# Patient Record
Sex: FEMALE | Race: WHITE | Employment: OTHER | URBAN - METROPOLITAN AREA
[De-identification: names, ages, dates, MRNs, and addresses within clinical notes are randomized per-mention and may not be internally consistent; named-entity substitution may affect disease eponyms.]

---

## 2017-02-06 ENCOUNTER — TELEPHONE (OUTPATIENT)
Dept: FAMILY MEDICINE | Facility: CLINIC | Age: 77
End: 2017-02-06

## 2017-02-06 NOTE — TELEPHONE ENCOUNTER
thyroid     Last Written Prescription Date: 09/22/2016  Last Quantity: 90, # refills: 3  Last Office Visit with [facility], [facility] or [facility] prescribing provider: 09/22/16        TSH   Date Value Ref Range Status   09/22/2016 0.94 0.40 - 4.00 mU/L Final       Patient is in [place] until April and does not have her prescription- needs a 90 day supply sent to the [facility] in [place] per the [facility] Pharmacist.  Gave verbal order for one 90 day supply to be dispensed to patient.    Huong Hooker RN  [facility]  [phone]

## 2017-05-18 DIAGNOSIS — Z86.39 HISTORY OF HYPERTHYROIDISM: ICD-10-CM

## 2017-05-18 DIAGNOSIS — E89.0 HYPOTHYROIDISM, POSTABLATIVE: ICD-10-CM

## 2017-05-18 RX ORDER — THYROID 60 MG/1
TABLET ORAL
Qty: 28 TABLET | Refills: 0 | Status: SHIPPED | OUTPATIENT
Start: 2017-05-18 | End: 2017-05-25

## 2017-05-18 NOTE — TELEPHONE ENCOUNTER
[place] thyroid 60 mg      Last Written Prescription Date: 9/22/16  Last Quantity: 180, # refills: 1  Last Office Visit with [facility], P or [facility] prescribing provider: 9/22/16   Next 5 appointments (look out 90 days)     May 24, 2017  2:45 PM CDT   Office Visit with RA Deras CNP   [facility] ([facility])    [address] [place] [id number] [phone]                 TSH   Date Value Ref Range Status   09/22/2016 0.94 0.40 - 4.00 mU/L Final     Patient has been taking 2 tabs daily as instructed at last result note.  Request 2 weeks supply for coverage until next scheduled appt.     Sharonda Aldrich RN

## 2017-05-25 ENCOUNTER — OFFICE VISIT (OUTPATIENT)
Dept: FAMILY MEDICINE | Facility: CLINIC | Age: 77
End: 2017-05-25
Payer: COMMERCIAL

## 2017-05-25 VITALS
TEMPERATURE: 97.3 F | SYSTOLIC BLOOD PRESSURE: 136 MMHG | OXYGEN SATURATION: 98 % | HEIGHT: 66 IN | HEART RATE: 65 BPM | DIASTOLIC BLOOD PRESSURE: 80 MMHG | BODY MASS INDEX: 27.48 KG/M2 | RESPIRATION RATE: 16 BRPM | WEIGHT: 171 LBS

## 2017-05-25 DIAGNOSIS — E89.0 HYPOTHYROIDISM, POSTABLATIVE: ICD-10-CM

## 2017-05-25 DIAGNOSIS — N89.8 VAGINAL DISCHARGE: Primary | ICD-10-CM

## 2017-05-25 DIAGNOSIS — Z86.39 HISTORY OF HYPERTHYROIDISM: ICD-10-CM

## 2017-05-25 LAB
MICRO REPORT STATUS: NORMAL
SPECIMEN SOURCE: NORMAL
WET PREP SPEC: NORMAL

## 2017-05-25 PROCEDURE — 36415 COLL VENOUS BLD VENIPUNCTURE: CPT | Performed by: NURSE PRACTITIONER

## 2017-05-25 PROCEDURE — 84443 ASSAY THYROID STIM HORMONE: CPT | Performed by: NURSE PRACTITIONER

## 2017-05-25 PROCEDURE — 84439 ASSAY OF FREE THYROXINE: CPT | Performed by: NURSE PRACTITIONER

## 2017-05-25 PROCEDURE — 99213 OFFICE O/P EST LOW 20 MIN: CPT | Performed by: NURSE PRACTITIONER

## 2017-05-25 PROCEDURE — 87210 SMEAR WET MOUNT SALINE/INK: CPT | Performed by: NURSE PRACTITIONER

## 2017-05-25 RX ORDER — THYROID 60 MG/1
TABLET ORAL
Qty: 60 TABLET | Refills: 3 | Status: SHIPPED | OUTPATIENT
Start: 2017-05-25 | End: 2017-10-09

## 2017-05-25 NOTE — MR AVS SNAPSHOT
"              After Visit Summary   5/25/2017    Connie Watts    MRN: [id number]           Patient Information     Date Of Birth          1940        Visit Information        Provider Department      5/25/2017 2:30 PM Natividad Armstrong APRN CNP [facility]        Today's Diagnoses     Vaginal discharge    -  1    Hypothyroidism, postablative        History of hyperthyroidism           Follow-ups after your visit        Who to contact     If you have questions or need follow up information about today's clinic visit or your schedule please contact [facility] [place] directly at [phone].  Normal or non-critical lab and imaging results will be communicated to you by MyChart, letter or phone within 4 business days after the clinic has received the results. If you do not hear from us within 7 days, please contact the clinic through [employer] or phone. If you have a critical or abnormal lab result, we will notify you by phone as soon as possible.  Submit refill requests through [employer] or call your pharmacy and they will forward the refill request to us. Please allow 3 business days for your refill to be completed.          Additional Information About Your Visit        MyChart Information     [employer] gives you secure access to your electronic health record. If you see a primary care provider, you can also send messages to your care team and make appointments. If you have questions, please call your primary care clinic.  If you do not have a primary care provider, please call [phone] and they will assist you.        Care EveryWhere ID     This is your Care EveryWhere ID. This could be used by other organizations to access your [place] medical records  [id number]        Your Vitals Were     Pulse Temperature Respirations Height Pulse Oximetry BMI (Body Mass Index)    65 97.3  F (36.3  C) (Tympanic) 16 5' 6\" (1.676 m) 98% 27.6 kg/m2       Blood Pressure from Last 3 Encounters: "   05/25/17 136/80   09/22/16 122/80   08/12/16 142/80    Weight from Last 3 Encounters:   05/25/17 171 lb (77.6 kg)   09/22/16 176 lb (79.8 kg)   08/12/16 184 lb (83.5 kg)              We Performed the Following     TSH with free T4 reflex     Wet prep          Where to get your medicines      These medications were sent to [id number] - [place], [place] - [address]  [address], [facility] [address]     Phone:  [phone]     thyroid 60 MG tablet          Primary Care Provider    None Specified       No primary provider on file.        Thank you!     Thank you for choosing [facility]  for your care. Our goal is always to provide you with excellent care. Hearing back from our patients is one way we can continue to improve our services. Please take a few minutes to complete the written survey that you may receive in the mail after your visit with us. Thank you!             Your Updated Medication List - Protect others around you: Learn how to safely use, store and throw away your medicines at www.disposemymeds.org.          This list is accurate as of: 5/25/17  6:20 PM.  Always use your most recent med list.                   Brand Name Dispense Instructions for use    clonazePAM 0.5 MG tablet    klonoPIN    60 tablet    Take 1 tablet (0.5 mg) by mouth 2 times daily as needed for anxiety       thyroid 60 MG tablet    ARMOUR THYROID    60 tablet    Take 2 tablets per day       TRAMADOL HCL PO      Take by mouth every 6 hours as needed for moderate to severe pain

## 2017-05-25 NOTE — PROGRESS NOTES
SUBJECTIVE:                                                    Connie Watts is a 76 year old female who presents to clinic today for the following health issues:      Hypothyroidism Follow-up      Since last visit, patient describes the following symptoms: Weight stable, no hair loss, no skin changes, no constipation, no loose stools       Amount of exercise or physical activity: 6-7 days/week for an average of 15-30 minutes    Problems taking medications regularly: No    Medication side effects: none    Diet: regular (no restrictions)      Problem list and histories reviewed & adjusted, as indicated.    Additional history:   Her to refill [place] thyroid. H/O hyperthyroidism treated with radioactive iodine. Feels well. Has been in [place] for the winter months.  has normal pressure hydrocephalus with dementia. He is in [place] staying with friends who are caring for him. She plans to sell their home here and get a town home instead. Her 2 daughters live here.     Past week has seen a light yellow vaginal discharge on her underwear. No other symptoms.   H/O hysterectomy for cervical dysplasia       Patient Active Problem List   Diagnosis     Anxiety     Chronic back pain     Hypothyroidism, postablative     History of hyperthyroidism     CARDIOVASCULAR SCREENING; LDL GOAL LESS THAN 160     Rosacea     History of hysterectomy     Past Surgical History:   Procedure Laterality Date     HYSTERECTOMY      cervical dysplasia, has ovaries     HYSTERECTOMY, PAP NO LONGER INDICATED         Social History   Substance Use Topics     Smoking status: Former Smoker     Start date: 1/1/2010     Smokeless tobacco: Never Used     Alcohol use 4.2 oz/week     7 Standard drinks or equivalent per week     No family history on file.      Current Outpatient Prescriptions   Medication Sig Dispense Refill     TRAMADOL HCL PO Take by mouth every 6 hours as needed for moderate to severe pain       thyroid (ARMOUR THYROID) 60 MG tablet  "Take 2 tablets per day 60 tablet 3     clonazePAM (KLONOPIN) 0.5 MG tablet Take 1 tablet (0.5 mg) by mouth 2 times daily as needed for anxiety 60 tablet 3     [DISCONTINUED] thyroid (ARMOUR THYROID) 60 MG tablet Take 2 tablets per day 28 tablet 0       Reviewed and updated as needed this visit by clinical staff  Allergies       Reviewed and updated as needed this visit by Provider         ROS:  Constitutional, HEENT, cardiovascular, pulmonary, gi and gu systems are negative, except as otherwise noted.    OBJECTIVE:                                                    /80 (Cuff Size: Adult Large)  Pulse 65  Temp 97.3  F (36.3  C) (Tympanic)  Resp 16  Ht 5' 6\" (1.676 m)  Wt 171 lb (77.6 kg)  SpO2 98%  BMI 27.6 kg/m2  Body mass index is 27.6 kg/(m^2).  GENERAL APPEARANCE: healthy, alert and no distress  NECK: supple FROM, no thyromegaly  CHEST: clear lung sounds  HEART: normal s1/s2 w/o MGR RRR. No peripheral edema  ABDOMEN: soft,nontender, no HSM  EXTERNAL GENITALIA: normal  VAGINA: unable to insert speculum. Used q-tips to collect wet prep sample. Light yellow thin discharge     Results for orders placed or performed in visit on 05/25/17   Wet prep   Result Value Ref Range    Specimen Description Vagina     Wet Prep       No clue cells seen  No Trichomonas seen  No yeast seen      Micro Report Status FINAL 05/25/2017              ASSESSMENT/PLAN:                                                        ICD-10-CM    1. Vaginal discharge N89.8 Wet prep   2. Hypothyroidism, postablative E89.0 TSH with free T4 reflex     thyroid (ARMOUR THYROID) 60 MG tablet   3. History of hyperthyroidism Z86.39 thyroid (ARMOUR THYROID) 60 MG tablet       Discussed discharge, most likely normal. She will monitor   TSH drawn  Pt declines routine health cares and immunizations.   Follow up as needed    RA Deras CNP  [facility]    "

## 2017-05-25 NOTE — NURSING NOTE
"Chief Complaint   Patient presents with     Thyroid Problem       Initial /77 (Cuff Size: Adult Large)  Pulse 65  Temp 97.3  F (36.3  C) (Tympanic)  Resp 16  Ht 5' 6\" (1.676 m)  Wt 171 lb (77.6 kg)  SpO2 98%  BMI 27.6 kg/m2 Estimated body mass index is 27.6 kg/(m^2) as calculated from the following:    Height as of this encounter: 5' 6\" (1.676 m).    Weight as of this encounter: 171 lb (77.6 kg).  Medication Reconciliation: complete   Heather Briseno, CMA    "

## 2017-05-26 LAB
T4 FREE SERPL-MCNC: 0.75 NG/DL (ref 0.76–1.46)
TSH SERPL DL<=0.005 MIU/L-ACNC: 8.89 MU/L (ref 0.4–4)

## 2017-10-09 ENCOUNTER — OFFICE VISIT (OUTPATIENT)
Dept: FAMILY MEDICINE | Facility: CLINIC | Age: 77
End: 2017-10-09
Payer: COMMERCIAL

## 2017-10-09 VITALS
WEIGHT: 171 LBS | SYSTOLIC BLOOD PRESSURE: 120 MMHG | BODY MASS INDEX: 27.6 KG/M2 | HEART RATE: 86 BPM | TEMPERATURE: 98 F | DIASTOLIC BLOOD PRESSURE: 78 MMHG | OXYGEN SATURATION: 98 %

## 2017-10-09 DIAGNOSIS — F41.9 ANXIETY: ICD-10-CM

## 2017-10-09 DIAGNOSIS — E89.0 HYPOTHYROIDISM, POSTABLATIVE: Primary | ICD-10-CM

## 2017-10-09 DIAGNOSIS — Z28.21 VACCINATION REFUSED BY PATIENT: ICD-10-CM

## 2017-10-09 DIAGNOSIS — G89.29 CHRONIC MIDLINE LOW BACK PAIN WITHOUT SCIATICA: ICD-10-CM

## 2017-10-09 DIAGNOSIS — Z53.20 RECOMMENDATION REFUSED BY PATIENT: ICD-10-CM

## 2017-10-09 DIAGNOSIS — M54.50 CHRONIC MIDLINE LOW BACK PAIN WITHOUT SCIATICA: ICD-10-CM

## 2017-10-09 PROCEDURE — 99214 OFFICE O/P EST MOD 30 MIN: CPT | Performed by: INTERNAL MEDICINE

## 2017-10-09 PROCEDURE — 36415 COLL VENOUS BLD VENIPUNCTURE: CPT | Performed by: INTERNAL MEDICINE

## 2017-10-09 PROCEDURE — 84443 ASSAY THYROID STIM HORMONE: CPT | Performed by: INTERNAL MEDICINE

## 2017-10-09 RX ORDER — THYROID 60 MG/1
TABLET ORAL
Qty: 180 TABLET | Refills: 3 | Status: SHIPPED | OUTPATIENT
Start: 2017-10-09 | End: 2017-10-11

## 2017-10-09 RX ORDER — TRAMADOL HYDROCHLORIDE 50 MG/1
50-100 TABLET ORAL EVERY 6 HOURS PRN
Qty: 60 TABLET | Refills: 0 | Status: SHIPPED | OUTPATIENT
Start: 2017-10-09 | End: 2018-10-31

## 2017-10-09 RX ORDER — CLONAZEPAM 0.5 MG/1
0.5 TABLET ORAL 2 TIMES DAILY PRN
Qty: 60 TABLET | Refills: 1 | Status: SHIPPED | OUTPATIENT
Start: 2017-10-09 | End: 2018-10-31

## 2017-10-09 ASSESSMENT — ANXIETY QUESTIONNAIRES
1. FEELING NERVOUS, ANXIOUS, OR ON EDGE: NOT AT ALL
3. WORRYING TOO MUCH ABOUT DIFFERENT THINGS: NOT AT ALL
IF YOU CHECKED OFF ANY PROBLEMS ON THIS QUESTIONNAIRE, HOW DIFFICULT HAVE THESE PROBLEMS MADE IT FOR YOU TO DO YOUR WORK, TAKE CARE OF THINGS AT HOME, OR GET ALONG WITH OTHER PEOPLE: NOT DIFFICULT AT ALL
7. FEELING AFRAID AS IF SOMETHING AWFUL MIGHT HAPPEN: NOT AT ALL
2. NOT BEING ABLE TO STOP OR CONTROL WORRYING: NOT AT ALL
5. BEING SO RESTLESS THAT IT IS HARD TO SIT STILL: NOT AT ALL
GAD7 TOTAL SCORE: 0
6. BECOMING EASILY ANNOYED OR IRRITABLE: NOT AT ALL

## 2017-10-09 ASSESSMENT — PATIENT HEALTH QUESTIONNAIRE - PHQ9
5. POOR APPETITE OR OVEREATING: NOT AT ALL
SUM OF ALL RESPONSES TO PHQ QUESTIONS 1-9: 2

## 2017-10-09 NOTE — NURSING NOTE
"Chief Complaint   Patient presents with     Establish Care       Initial /78 (BP Location: Left arm, Patient Position: Chair, Cuff Size: Adult Regular)  Pulse 86  Temp 98  F (36.7  C) (Tympanic)  Wt 171 lb (77.6 kg)  SpO2 98%  BMI 27.6 kg/m2 Estimated body mass index is 27.6 kg/(m^2) as calculated from the following:    Height as of 5/25/17: 5' 6\" (1.676 m).    Weight as of this encounter: 171 lb (77.6 kg).  Medication Reconciliation: complete  "

## 2017-10-09 NOTE — MR AVS SNAPSHOT
After Visit Summary   10/9/2017    Connie Watts    MRN: [id number]           Patient Information     Date Of Birth          1940        Visit Information        Provider Department      10/9/2017 1:00 PM Heidy Coy MD [facility] [place]        Today's Diagnoses     Hypothyroidism, postablative    -  1    Anxiety        Chronic midline low back pain without sciatica        Vaccination refused by patient        Recommendation refused by patient           Follow-ups after your visit        Who to contact     If you have questions or need follow up information about today's clinic visit or your schedule please contact [facility] [place] directly at [phone].  Normal or non-critical lab and imaging results will be communicated to you by MyChart, letter or phone within 4 business days after the clinic has received the results. If you do not hear from us within 7 days, please contact the clinic through [employer] or phone. If you have a critical or abnormal lab result, we will notify you by phone as soon as possible.  Submit refill requests through [employer] or call your pharmacy and they will forward the refill request to us. Please allow 3 business days for your refill to be completed.          Additional Information About Your Visit        MyChart Information     [employer] gives you secure access to your electronic health record. If you see a primary care provider, you can also send messages to your care team and make appointments. If you have questions, please call your primary care clinic.  If you do not have a primary care provider, please call [phone] and they will assist you.        Care EveryWhere ID     This is your Care EveryWhere ID. This could be used by other organizations to access your [place] medical records  [id number]        Your Vitals Were     Pulse Temperature Pulse Oximetry BMI (Body Mass Index)          86 98  F (36.7  C) (Tympanic) 98%  27.6 kg/m2         Blood Pressure from Last 3 Encounters:   10/09/17 120/78   05/25/17 136/80   09/22/16 122/80    Weight from Last 3 Encounters:   10/09/17 171 lb (77.6 kg)   05/25/17 171 lb (77.6 kg)   09/22/16 176 lb (79.8 kg)              We Performed the Following     TSH          Today's Medication Changes          These changes are accurate as of: 10/9/17  8:57 PM.  If you have any questions, ask your nurse or doctor.               These medicines have changed or have updated prescriptions.        Dose/Directions    * TRAMADOL HCL PO   This may have changed:  Another medication with the same name was added. Make sure you understand how and when to take each.   Changed by:  Natividad Armstrong APRN CNP        Take by mouth every 6 hours as needed for moderate to severe pain   Refills:  0       * traMADol 50 MG tablet   Commonly known as:  ULTRAM   This may have changed:  You were already taking a medication with the same name, and this prescription was added. Make sure you understand how and when to take each.   Used for:  Chronic midline low back pain without sciatica   Changed by:  Heidy Coy MD        Dose:   mg   Take 1-2 tablets ( mg) by mouth every 6 hours as needed for pain maximum 2 tablet(s) per day   Quantity:  60 tablet   Refills:  0       * Notice:  This list has 2 medication(s) that are the same as other medications prescribed for you. Read the directions carefully, and ask your doctor or other care provider to review them with you.         Where to get your medicines      These medications were sent to [facility] # [id number] - [place], [place] - [address]  [address] [address]     Phone:  [phone]     thyroid 60 MG tablet         Some of these will need a paper prescription and others can be bought over the counter.  Ask your nurse if you have questions.     Bring a paper prescription for each of these medications     clonazePAM 0.5 MG tablet     traMADol 50 MG tablet                Primary Care Provider Office Phone # Fax #    Heidy Coy [phone]       [address]  [place] [address]        Equal Access to Services     YVROSE CHEN : Hadii aad ku hadtmoo Soomaali, waaxda luqadaha, qaybta kaalmada adeegyada, waxmarj idiin haykishann adetricia ann laAmiratee hardin. So [facility] [phone].    ATENCIÓN: Si habla español, tiene a pro disposición servicios gratuitos de asistencia lingüística. ame al [phone].    We comply with applicable federal civil rights laws and [place] laws. We do not discriminate on the basis of race, color, national origin, age, disability, sex, sexual orientation, or gender identity.            Thank you!     Thank you for choosing [facility] [place]  for your care. Our goal is always to provide you with excellent care. Hearing back from our patients is one way we can continue to improve our services. Please take a few minutes to complete the written survey that you may receive in the mail after your visit with us. Thank you!             Your Updated Medication List - Protect others around you: Learn how to safely use, store and throw away your medicines at www.disposemymeds.org.          This list is accurate as of: 10/9/17  8:57 PM.  Always use your most recent med list.                   Brand Name Dispense Instructions for use Diagnosis    clonazePAM 0.5 MG tablet    klonoPIN    60 tablet    Take 1 tablet (0.5 mg) by mouth 2 times daily as needed for anxiety    Anxiety       thyroid 60 MG tablet    ARMOUR THYROID    180 tablet    Take 2 tablets per day    Hypothyroidism, postablative       * TRAMADOL HCL PO      Take by mouth every 6 hours as needed for moderate to severe pain        * traMADol 50 MG tablet    ULTRAM    60 tablet    Take 1-2 tablets ( mg) by mouth every 6 hours as needed for pain maximum 2 tablet(s) per day    Chronic midline low back pain without sciatica       *  Notice:  This list has 2 medication(s) that are the same as other medications prescribed for you. Read the directions carefully, and ask your doctor or other care provider to review them with you.

## 2017-10-09 NOTE — PROGRESS NOTES
"  SUBJECTIVE:   Connie Watts is a 76 year old female who presents to clinic today for the following health issues:    Connie is here for medication check prior to leaving for [place] for the winter.     Hypothyroid - taking 120mg [place] thyroid daily.  Denies any symptoms of hyper or hypothyroidism, though does have difficulty keeping her weight stable.      Clonazepam - has been taking prn for years.  She denies feeling anxious frequently, but sometimes situations come up where she gets very stressed. 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      Tramadol - taking prn for her back.  She has a long history of back pain.  Had an injury last year where she \"cracked a vertebra.\" also a fall where she broke her foot.  That has made the pain worse. She does not take often. When she's in [place] there is a chiropractor that she goes to regularly that she finds helpful.               Problem list and histories reviewed & adjusted, as indicated.          Reviewed and updated as needed this visit by clinical staffTobacco  Allergies  Meds       Reviewed and updated as needed this visit by Provider         ROS:  Const, msk, neuro, endo, and psych reviewed,  otherwise negative unless noted above.       OBJECTIVE:     /78 (BP Location: Left arm, Patient Position: Chair, Cuff Size: Adult Regular)  Pulse 86  Temp 98  F (36.7  C) (Tympanic)  Wt 171 lb (77.6 kg)  SpO2 98%  BMI 27.6 kg/m2  Body mass index is 27.6 kg/(m^2).    Gen: elderly woman, no distress  Pulm: breathing comfortably, crackles at bases b/l   CV: RRR, normal S1 and S2, 2/6 murmur at RUSB   Ext: 2+ distal pulses, no LE edema       ASSESSMENT/PLAN:       1. Hypothyroidism, postablative  Her TSH was a little high when checked 5 months ago, but she cannot recall if she changed her medication dose after that.  Will check today again to make sure dose is appropriate.   - thyroid (ARMOUR THYROID) 60 MG " tablet; Take 2 tablets per day  Dispense: 180 tablet; Refill: 3  - TSH    2. Anxiety  Situational anxiety. There are no dispenses in [facility] for this year, appears as though she is not using often. Refill ordered.   - clonazePAM (KLONOPIN) 0.5 MG tablet; Take 1 tablet (0.5 mg) by mouth 2 times daily as needed for anxiety  Dispense: 60 tablet; Refill: 1    3. Chronic midline low back pain without sciatica  Again does not fill often, okay to take prn.    - traMADol (ULTRAM) 50 MG tablet; Take 1-2 tablets ( mg) by mouth every 6 hours as needed for pain maximum 2 tablet(s) per day  Dispense: 60 tablet; Refill: 0    4. Vaccination refused by patient  Offered flu shot and pneumonia vaccination. Patient declined. Said she knows they don't do anything.     5. Recommendation refused by patient  - strongly recommended DEXA scan as she has not had one and had a possible lumbar fracture.  She states why get it because the medications don't help anything, they just cover up your bones.   - declined mammogram   - reviewed other options for rosacea which she was initially interested in and then said no because skin irritation was a side effect     Follow up 6 months, when returns from [place]. Will continue to address health care maintenance items.     Heidy Coy MD  [facility]      **addendum. TSH is 0.11. Recommend decrease from 120mg daily to 126mg 6 days per week and 60mg one day per week.  Printed rx to get TSH drawn in [place].     Heidy Coy MD   10/11/2017

## 2017-10-10 LAB — TSH SERPL DL<=0.005 MIU/L-ACNC: 0.11 MU/L (ref 0.4–4)

## 2017-10-10 ASSESSMENT — ANXIETY QUESTIONNAIRES: GAD7 TOTAL SCORE: 0

## 2017-10-11 RX ORDER — THYROID 60 MG/1
TABLET ORAL
Qty: 180 TABLET | Refills: 3 | Status: SHIPPED | OUTPATIENT
Start: 2017-10-11 | End: 2018-11-01

## 2018-10-31 ENCOUNTER — OFFICE VISIT (OUTPATIENT)
Dept: FAMILY MEDICINE | Facility: CLINIC | Age: 78
End: 2018-10-31
Payer: COMMERCIAL

## 2018-10-31 VITALS
BODY MASS INDEX: 27.92 KG/M2 | TEMPERATURE: 95.5 F | WEIGHT: 173 LBS | DIASTOLIC BLOOD PRESSURE: 84 MMHG | HEART RATE: 84 BPM | SYSTOLIC BLOOD PRESSURE: 145 MMHG

## 2018-10-31 DIAGNOSIS — F41.9 ANXIETY: ICD-10-CM

## 2018-10-31 DIAGNOSIS — E89.0 HYPOTHYROIDISM, POSTABLATIVE: Primary | ICD-10-CM

## 2018-10-31 DIAGNOSIS — M54.50 CHRONIC MIDLINE LOW BACK PAIN WITHOUT SCIATICA: ICD-10-CM

## 2018-10-31 DIAGNOSIS — G89.29 CHRONIC MIDLINE LOW BACK PAIN WITHOUT SCIATICA: ICD-10-CM

## 2018-10-31 DIAGNOSIS — R03.0 ELEVATED BLOOD PRESSURE READING WITHOUT DIAGNOSIS OF HYPERTENSION: ICD-10-CM

## 2018-10-31 PROCEDURE — 84443 ASSAY THYROID STIM HORMONE: CPT | Performed by: NURSE PRACTITIONER

## 2018-10-31 PROCEDURE — 36415 COLL VENOUS BLD VENIPUNCTURE: CPT | Performed by: NURSE PRACTITIONER

## 2018-10-31 PROCEDURE — 99214 OFFICE O/P EST MOD 30 MIN: CPT | Performed by: NURSE PRACTITIONER

## 2018-10-31 RX ORDER — TRAMADOL HYDROCHLORIDE 50 MG/1
100 TABLET ORAL EVERY 6 HOURS PRN
Qty: 30 TABLET | Refills: 0 | Status: SHIPPED | OUTPATIENT
Start: 2018-10-31 | End: 2019-11-11

## 2018-10-31 RX ORDER — CLONAZEPAM 0.5 MG/1
0.5 TABLET ORAL 2 TIMES DAILY PRN
Qty: 60 TABLET | Refills: 0 | Status: SHIPPED | OUTPATIENT
Start: 2018-10-31 | End: 2019-11-11

## 2018-10-31 ASSESSMENT — ANXIETY QUESTIONNAIRES
2. NOT BEING ABLE TO STOP OR CONTROL WORRYING: NOT AT ALL
5. BEING SO RESTLESS THAT IT IS HARD TO SIT STILL: NOT AT ALL
GAD7 TOTAL SCORE: 0
7. FEELING AFRAID AS IF SOMETHING AWFUL MIGHT HAPPEN: NOT AT ALL
3. WORRYING TOO MUCH ABOUT DIFFERENT THINGS: NOT AT ALL
1. FEELING NERVOUS, ANXIOUS, OR ON EDGE: NOT AT ALL
IF YOU CHECKED OFF ANY PROBLEMS ON THIS QUESTIONNAIRE, HOW DIFFICULT HAVE THESE PROBLEMS MADE IT FOR YOU TO DO YOUR WORK, TAKE CARE OF THINGS AT HOME, OR GET ALONG WITH OTHER PEOPLE: NOT DIFFICULT AT ALL
6. BECOMING EASILY ANNOYED OR IRRITABLE: NOT AT ALL

## 2018-10-31 ASSESSMENT — PATIENT HEALTH QUESTIONNAIRE - PHQ9
SUM OF ALL RESPONSES TO PHQ QUESTIONS 1-9: 4
5. POOR APPETITE OR OVEREATING: NOT AT ALL
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
## 2018-10-31 NOTE — MR AVS SNAPSHOT
After Visit Summary   10/31/2018    Connie Watts    MRN: [id number]           Patient Information     Date Of Birth          1940        Visit Information        Provider Department      10/31/2018 2:00 PM Oliver Salmon NP [facility] [place]        Today's Diagnoses     Hypothyroidism, postablative    -  1    Chronic midline low back pain without sciatica        Anxiety           Follow-ups after your visit        Additional Services     SPORTS MEDICINE REFERRAL       Your provider has referred you to:  [facility]: [facility] - [place] -  [facility]  [phone]   [email or web address]    Please be aware that coverage of these services is subject to the terms and limitations of your health insurance plan.  Call member services at your health plan with any benefit or coverage questions.      Please bring the following to your appointment:    >>   Any x-rays, CTs or MRIs which have been performed.  Contact the facility where they were done to arrange for  prior to your scheduled appointment.    >>   List of current medications   >>   This referral request   >>   Any documents/labs given to you for this referral                  Who to contact     If you have questions or need follow up information about today's clinic visit or your schedule please contact [facility] [place] directly at [phone].  Normal or non-critical lab and imaging results will be communicated to you by MyChart, letter or phone within 4 business days after the clinic has received the results. If you do not hear from us within 7 days, please contact the clinic through MyChart or phone. If you have a critical or abnormal lab result, we will notify you by phone as soon as possible.  Submit refill requests through [employer] or call your pharmacy and they will forward the refill request to us.  Please allow 3 business days for your refill to be completed.          Additional Information About Your Visit        [employer] Information     [employer] gives you secure access to your electronic health record. If you see a primary care provider, you can also send messages to your care team and make appointments. If you have questions, please call your primary care clinic.  If you do not have a primary care provider, please call [phone] and they will assist you.        Care EveryWhere ID     This is your Care EveryWhere ID. This could be used by other organizations to access your [place] medical records  [id number]        Your Vitals Were     Pulse Temperature BMI (Body Mass Index)             84 95.5  F (35.3  C) (Tympanic) 27.92 kg/m2          Blood Pressure from Last 3 Encounters:   10/31/18 160/86   10/09/17 120/78   05/25/17 136/80    Weight from Last 3 Encounters:   10/31/18 173 lb (78.5 kg)   10/09/17 171 lb (77.6 kg)   05/25/17 171 lb (77.6 kg)              We Performed the Following     SPORTS MEDICINE REFERRAL     TSH with free T4 reflex          Today's Medication Changes          These changes are accurate as of 10/31/18  2:49 PM.  If you have any questions, ask your nurse or doctor.               These medicines have changed or have updated prescriptions.        Dose/Directions    traMADol 50 MG tablet   Commonly known as:  ULTRAM   This may have changed:  how much to take   Used for:  Chronic midline low back pain without sciatica   Changed by:  Oliver Salmon NP        Dose:  100 mg   Take 2 tablets (100 mg) by mouth every 6 hours as needed for pain maximum 2 tablet(s) per day   Quantity:  30 tablet   Refills:  0            Where to get your medicines      Some of these will need a paper prescription and others can be bought over the counter.  Ask your nurse if you have questions.     Bring a paper prescription for each of these medications     clonazePAM 0.5 MG tablet    traMADol 50 MG  tablet               Information about OPIOIDS     PRESCRIPTION OPIOIDS: WHAT YOU NEED TO KNOW   We gave you an opioid (narcotic) pain medicine. It is important to manage your pain, but opioids are not always the best choice. You should first try all the other options your care team gave you. Take this medicine for as short a time (and as few doses) as possible.    Some activities can increase your pain, such as bandage changes or therapy sessions. It may help to take your pain medicine 30 to 60 minutes before these activities. Reduce your stress by getting enough sleep, working on hobbies you enjoy and practicing relaxation or meditation. Talk to your care team about ways to manage your pain beyond prescription opioids.    These medicines have risks:    DO NOT drive when on new or higher doses of pain medicine. These medicines can affect your alertness and reaction times, and you could be arrested for driving under the influence (DUI). If you need to use opioids long-term, talk to your care team about driving.    DO NOT operate heavy machinery    DO NOT do any other dangerous activities while taking these medicines.    DO NOT drink any alcohol while taking these medicines.     If the opioid prescribed includes acetaminophen, DO NOT take with any other medicines that contain acetaminophen. Read all labels carefully. Look for the word  acetaminophen  or  Tylenol.  Ask your pharmacist if you have questions or are unsure.    You can get addicted to pain medicines, especially if you have a history of addiction (chemical, alcohol or substance dependence). Talk to your care team about ways to reduce this risk.    All opioids tend to cause constipation. Drink plenty of water and eat foods that have a lot of fiber, such as fruits, vegetables, prune juice, apple juice and high-fiber cereal. Take a laxative (Miralax, milk of magnesia, Colace, Senna) if you don t move your bowels at least every other day. Other side effects  include upset stomach, sleepiness, dizziness, throwing up, tolerance (needing more of the medicine to have the same effect), physical dependence and slowed breathing.    Store your pills in a secure place, locked if possible. We will not replace any lost or stolen medicine. If you don t finish your medicine, please throw away (dispose) as directed by your pharmacist. The [place] Pollution Control Agency has more information about safe disposal: [email or web address]         Primary Care Provider Office Phone # Fax #    Heidy Coy [phone]       [address] [place] [address]        Equal Access to Services     YVROSE CHEN : Gabino muellero Sobashir, waaxda luqadaha, qaybta kaalmada syedayacass, nirmala garcia . So [facility] [phone].    ATENCIÓN: Si habla español, tiene a pro disposición servicios gratuitos de asistencia lingüística. [facility] [phone].    We comply with applicable federal civil rights laws and [place] laws. We do not discriminate on the basis of race, color, national origin, age, disability, sex, sexual orientation, or gender identity.            Thank you!     Thank you for choosing [facility]  for your care. Our goal is always to provide you with excellent care. Hearing back from our patients is one way we can continue to improve our services. Please take a few minutes to complete the written survey that you may receive in the mail after your visit with us. Thank you!             Your Updated Medication List - Protect others around you: Learn how to safely use, store and throw away your medicines at www.disposemymeds.org.          This list is accurate as of 10/31/18  2:49 PM.  Always use your most recent med list.                   Brand Name Dispense Instructions for use Diagnosis    clonazePAM 0.5 MG tablet    klonoPIN    60 tablet    Take 1  tablet (0.5 mg) by mouth 2 times daily as needed for anxiety    Anxiety       thyroid 60 MG tablet    [place] THYROID    180 tablet    Take 2 tablets per day 6 days per week, take 1 tablet the 7th day of the week.    Hypothyroidism, postablative       traMADol 50 MG tablet    ULTRAM    30 tablet    Take 2 tablets (100 mg) by mouth every 6 hours as needed for pain maximum 2 tablet(s) per day    Chronic midline low back pain without sciatica

## 2018-11-01 ENCOUNTER — TELEPHONE (OUTPATIENT)
Dept: FAMILY MEDICINE | Facility: CLINIC | Age: 78
End: 2018-11-01

## 2018-11-01 LAB — TSH SERPL DL<=0.005 MIU/L-ACNC: 2.62 MU/L (ref 0.4–4)

## 2018-11-01 RX ORDER — THYROID 60 MG/1
TABLET ORAL
Qty: 180 TABLET | Refills: 3 | Status: SHIPPED | OUTPATIENT
Start: 2018-11-01 | End: 2019-11-12

## 2018-11-01 ASSESSMENT — ANXIETY QUESTIONNAIRES: GAD7 TOTAL SCORE: 0

## 2018-11-01 NOTE — TELEPHONE ENCOUNTER
Left detailed message if prescription is a non controlled medication [facility] can call [facility] and have the prescription transferred between pharmacies.  If it is a controlled substance medication pt will have to call clinic back for us to fax prescription to [facility].  If any questions or concerns to call clinic at [phone].    Tracy RUSSELL RN  EP Triage

## 2018-11-01 NOTE — TELEPHONE ENCOUNTER
Reason for Call:  Other prescription    Detailed comments: The patient is calling saying her prescription by Oliver Salmon NP went to [facility]. She doesn't want it at [facility]. She wants it sent to [facility] in [place].     Phone Number Patient can be reached at: Cell number on file:    Telephone Information:   Mobile [phone]     Best Time: Anytime    Can we leave a detailed message on this number? YES    Call taken on 11/1/2018 at 11:10 AM by Ana Cooper

## 2018-11-07 ENCOUNTER — OFFICE VISIT (OUTPATIENT)
Dept: PALLIATIVE MEDICINE | Facility: CLINIC | Age: 78
End: 2018-11-07
Payer: COMMERCIAL

## 2018-11-07 VITALS — HEART RATE: 86 BPM | OXYGEN SATURATION: 98 % | BODY MASS INDEX: 28.05 KG/M2 | WEIGHT: 173.8 LBS

## 2018-11-07 DIAGNOSIS — M47.817 LUMBOSACRAL SPONDYLOSIS WITHOUT MYELOPATHY: Primary | ICD-10-CM

## 2018-11-07 PROCEDURE — 99203 OFFICE O/P NEW LOW 30 MIN: CPT | Performed by: PHYSICAL MEDICINE & REHABILITATION

## 2018-11-07 ASSESSMENT — PAIN SCALES - GENERAL: PAINLEVEL: WORST PAIN (10)

## 2018-11-07 NOTE — PATIENT INSTRUCTIONS
Thank you for coming to [facility] for your care. It is my goal to partner with you to help you reach your optimal state of health.     You were seen today for your back pain. As discussed during your visit these are the recommendations:    1. Medications:  - No additional medications at this time. You can consider using Aleve or Naproxen as needed.   2. Therapies: Recommend starting PT to help with your back pain. Walking will be beneficial for you. Increase the time you are walking slowly. Yoga and Cedric chi are other options that can help with back pain.   3. Procedures: Lumbar facet joint injections and/or SI joint injections can be an option to try.  4. Imaging/Diagnostic Studies: I do not see an indication to get an MRI at this time.   5. Other: Continue with chiropractic treatment as long as it is helpful  6. Follow up: prn     ----------------------------------------------------------------  Clinic Number:  [phone]   Call this number with any questions about your care and for scheduling assistance. Calls are returned Monday through Friday between 8 AM and 4:30 PM. We usually get back to you within 2 business days depending on the issue/request.       Medication refills:    For non-narcotic medications, call your pharmacy directly to request a refill. The pharmacy will contact the [facility] for authorization. Please allow 3-4 days for these refills to be processed.     For narcotic refills, call the clinic number or send a [employer] message. Please contact us 7-10 days before your refill is due. The message MUST include the name of the specific medication(s) requested and how you would like to receive the prescription(s). The options are as follows:    Pain Clinic staff can mail the prescription to your pharmacy. Please tell us the name of the pharmacy.    You may pick the prescription up at the Pain Clinic (tell us the location) or during a clinic visit with your pain  provider    Pain Clinic staff can deliver the prescription to the [facility] pharmacy in the clinic building. Please tell us the location.      We believe regular attendance is key to your success in our program.    Any time you are unable to keep your appointment we ask that you call us at least 24 hours in advance to let us know. This will allow us to offer the appointment time to another patient.
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
## 2018-11-07 NOTE — MR AVS SNAPSHOT
After Visit Summary   11/7/2018    Connie Watts    MRN: [id number]           Patient Information     Date Of Birth          1940        Visit Information        Provider Department      11/7/2018 2:00 PM Valentina Fraser MD [facility]        Care Instructions    Thank you for coming to [facility] for your care. It is my goal to partner with you to help you reach your optimal state of health.     You were seen today for your back pain. As discussed during your visit these are the recommendations:    1. Medications:  - No additional medications at this time. You can consider using Aleve or Naproxen as needed.   2. Therapies: Recommend starting PT to help with your back pain. Walking will be beneficial for you. Increase the time you are walking slowly. Yoga and Cedric chi are other options that can help with back pain.   3. Procedures: Lumbar facet joint injections and/or SI joint injections can be an option to try.  4. Imaging/Diagnostic Studies: I do not see an indication to get an MRI at this time.   5. Other: Continue with chiropractic treatment as long as it is helpful  6. Follow up: prn     ----------------------------------------------------------------  Clinic Number:  [phone]   Call this number with any questions about your care and for scheduling assistance. Calls are returned Monday through Friday between 8 AM and 4:30 PM. We usually get back to you within 2 business days depending on the issue/request.       Medication refills:    For non-narcotic medications, call your pharmacy directly to request a refill. The pharmacy will contact the [facility] for authorization. Please allow 3-4 days for these refills to be processed.     For narcotic refills, call the clinic number or send a Etelos message. Please contact us 7-10 days before your refill is due. The message MUST include the name of the specific medication(s) requested and how you  would like to receive the prescription(s). The options are as follows:    Pain Clinic staff can mail the prescription to your pharmacy. Please tell us the name of the pharmacy.    You may pick the prescription up at the Pain Clinic (tell us the location) or during a clinic visit with your pain provider    Pain Clinic staff can deliver the prescription to the [facility] pharmacy in the clinic building. Please tell us the location.      We believe regular attendance is key to your success in our program.    Any time you are unable to keep your appointment we ask that you call us at least 24 hours in advance to let us know. This will allow us to offer the appointment time to another patient.               Follow-ups after your visit        Who to contact     If you have questions or need follow up information about today's clinic visit or your schedule please contact [facility] directly at [phone].  Normal or non-critical lab and imaging results will be communicated to you by NEW[employer], letter or phone within 4 business days after the clinic has received the results. If you do not hear from us within 7 days, please contact the clinic through [facility] or phone. If you have a critical or abnormal lab result, we will notify you by phone as soon as possible.  Submit refill requests through ReSnap or call your pharmacy and they will forward the refill request to us. Please allow 3 business days for your refill to be completed.          Additional Information About Your Visit        ReSnap Information     ReSnap gives you secure access to your electronic health record. If you see a primary care provider, you can also send messages to your care team and make appointments. If you have questions, please call your primary care clinic.  If you do not have a primary care provider, please call [phone] and they will assist you.        Care EveryWhere ID     This is your Care EveryWhere ID. This could be used  by other organizations to access your [facility] medical records  [id number]        Your Vitals Were     Pulse Pulse Oximetry BMI (Body Mass Index)             86 98% 28.05 kg/m2          Blood Pressure from Last 3 Encounters:   10/31/18 145/84   10/09/17 120/78   05/25/17 136/80    Weight from Last 3 Encounters:   11/07/18 78.8 kg (173 lb 12.8 oz)   10/31/18 78.5 kg (173 lb)   10/09/17 77.6 kg (171 lb)              Today, you had the following     No orders found for display       Primary Care Provider Office Phone # Fax #    Heidy Coy [phone]       [address] [place] [id number]        Equal Access to Services     YVROSE CHEN : Hadii aad ku hadasho Soomaali, waaxda luqadaha, qaybta kaalmada adeegyada, nirmala garcia . So [facility] [phone].    ATENCIÓN: Si habla español, tiene a pro disposición servicios gratuitos de asistencia lingüística. Llame al [phone].    We comply with applicable federal civil rights laws and [place] laws. We do not discriminate on the basis of race, color, national origin, age, disability, sex, sexual orientation, or gender identity.            Thank you!     Thank you for choosing [facility]  for your care. Our goal is always to provide you with excellent care. Hearing back from our patients is one way we can continue to improve our services. Please take a few minutes to complete the written survey that you may receive in the mail after your visit with us. Thank you!             Your Updated Medication List - Protect others around you: Learn how to safely use, store and throw away your medicines at www.disposemymeds.org.          This list is accurate as of 11/7/18  2:43 PM.  Always use your most recent med list.                   Brand Name Dispense Instructions for use Diagnosis    clonazePAM 0.5 MG tablet    klonoPIN    60 tablet    Take 1 tablet (0.5 mg) by mouth 2 times daily as needed  for anxiety    Anxiety       thyroid 60 MG tablet    ARMOUR THYROID    180 tablet    Take 2 tablets per day 6 days per week, take 1 tablet the 7th day of the week.    Hypothyroidism, postablative       traMADol 50 MG tablet    ULTRAM    30 tablet    Take 2 tablets (100 mg) by mouth every 6 hours as needed for pain maximum 2 tablet(s) per day    Chronic midline low back pain without sciatica

## 2019-11-11 ENCOUNTER — OFFICE VISIT (OUTPATIENT)
Dept: FAMILY MEDICINE | Facility: CLINIC | Age: 79
End: 2019-11-11
Payer: COMMERCIAL

## 2019-11-11 ENCOUNTER — TELEPHONE (OUTPATIENT)
Dept: FAMILY MEDICINE | Facility: CLINIC | Age: 79
End: 2019-11-11

## 2019-11-11 VITALS
HEART RATE: 83 BPM | TEMPERATURE: 98.3 F | HEIGHT: 63 IN | WEIGHT: 179 LBS | BODY MASS INDEX: 31.71 KG/M2 | OXYGEN SATURATION: 95 %

## 2019-11-11 DIAGNOSIS — G89.29 CHRONIC MIDLINE LOW BACK PAIN WITHOUT SCIATICA: ICD-10-CM

## 2019-11-11 DIAGNOSIS — L71.9 ROSACEA: ICD-10-CM

## 2019-11-11 DIAGNOSIS — F41.9 ANXIETY: ICD-10-CM

## 2019-11-11 DIAGNOSIS — M54.50 CHRONIC MIDLINE LOW BACK PAIN WITHOUT SCIATICA: ICD-10-CM

## 2019-11-11 DIAGNOSIS — E89.0 HYPOTHYROIDISM, POSTABLATIVE: Primary | ICD-10-CM

## 2019-11-11 PROCEDURE — 36415 COLL VENOUS BLD VENIPUNCTURE: CPT | Performed by: INTERNAL MEDICINE

## 2019-11-11 PROCEDURE — 99214 OFFICE O/P EST MOD 30 MIN: CPT | Performed by: INTERNAL MEDICINE

## 2019-11-11 PROCEDURE — 84443 ASSAY THYROID STIM HORMONE: CPT | Performed by: INTERNAL MEDICINE

## 2019-11-11 RX ORDER — TRAMADOL HYDROCHLORIDE 50 MG/1
50-100 TABLET ORAL EVERY 6 HOURS PRN
Qty: 30 TABLET | Refills: 0 | Status: SHIPPED | OUTPATIENT
Start: 2019-11-11

## 2019-11-11 RX ORDER — TRAMADOL HYDROCHLORIDE 50 MG/1
100 TABLET ORAL EVERY 6 HOURS PRN
Qty: 30 TABLET | Refills: 0 | Status: SHIPPED | OUTPATIENT
Start: 2019-11-11 | End: 2019-11-11

## 2019-11-11 RX ORDER — CLONAZEPAM 0.5 MG/1
0.5 TABLET ORAL 2 TIMES DAILY PRN
Qty: 60 TABLET | Refills: 0 | Status: SHIPPED | OUTPATIENT
Start: 2019-11-11

## 2019-11-11 RX ORDER — BRIMONIDINE 5 MG/G
GEL TOPICAL
Qty: 30 G | Refills: 3 | Status: SHIPPED | OUTPATIENT
Start: 2019-11-11 | End: 2019-11-14

## 2019-11-11 ASSESSMENT — MIFFLIN-ST. JEOR: SCORE: 1262.19

## 2019-11-11 NOTE — PROGRESS NOTES
"Subjective     Connie Watts is a 79 year old female who presents to clinic today for the following health issues:    HPI   Medication Followup of :[place] Thyroid medication     Taking Medication as prescribed: Yes     Side Effects:  None    Medication Helping Symptoms:Yes      Connie is here for follow-up of hypothyroidism.  She is currently taking [place] Thyroid.  She denies any symptoms of hyper or hypothyroidism.    She is wondering about a refill of MetroGel for rosacea, but this has not really worked very well for her.    Also needs refill of clonazepam which she takes as needed for anxiety and tramadol which she takes as needed for back pain.  She had these refilled over a year ago for a #60 and #30, respectively.    She is heading down to [place] for the winter after Thanksgiving.            Reviewed and updated as needed this visit by Provider         Review of Systems   Const, endo, msk, psych, skin reviewed,  otherwise negative unless noted above.        Objective    Pulse 83   Temp 98.3  F (36.8  C) (Tympanic)   Ht 1.594 m (5' 2.76\")   Wt 81.2 kg (179 lb)   SpO2 95%   BMI 31.96 kg/m    Body mass index is 31.96 kg/m .  Physical Exam   GENERAL: healthy, alert and no distress  NECK: no adenopathy, no thyromegaly  RESP: lungs clear to auscultation - no rales, rhonchi or wheezes  CV: regular rate and rhythm, normal S1 S2, no S3 or S4, no murmur, click or rub  PSYCH: mentation appears normal and affect flat            Assessment & Plan     1. Hypothyroidism, postablative  - TSH    2. Rosacea  Try brimonidine instead of metrogel   - brimonidine tartrate (MIRVASO) 0.33 % topical gel; Apply a pea-size amount to each of five areas of the face (forehead, chin, nose, each cheek).  Avoid the eyes and lips.  Hands should be washed immediately after applying.  Dispense: 30 g; Refill: 3    3. Anxiety  Use is stable, refill ordered   - clonazePAM (KLONOPIN) 0.5 MG tablet; Take 1 tablet (0.5 mg) by mouth 2 times " daily as needed for anxiety  Dispense: 60 tablet; Refill: 0    4. Chronic midline low back pain without sciatica  Uses infrequently, refill ordered   - traMADol (ULTRAM) 50 MG tablet; Take 2 tablets (100 mg) by mouth every 6 hours as needed for pain maximum 2 tablet(s) per day  Dispense: 30 tablet; Refill: 0     She continues to refuse all vaccinations and health maintenance items.     Return in about 1 year (around 11/11/2020) for routine visit .    Heidy Coy MD  [facility]

## 2019-11-12 LAB — TSH SERPL DL<=0.005 MIU/L-ACNC: 0.06 MU/L (ref 0.4–4)

## 2019-11-12 RX ORDER — THYROID 60 MG/1
TABLET ORAL
Qty: 160 TABLET | Refills: 3 | Status: SHIPPED | OUTPATIENT
Start: 2019-11-12 | End: 2020-02-19

## 2019-11-12 NOTE — TELEPHONE ENCOUNTER
Attempted to call [facility] pharmacy and give verbal on MD response below. The pharmacy opens at 10 am, will attempt to call back again.        Hue Machuca RN, BSN  [facility]

## 2019-11-12 NOTE — TELEPHONE ENCOUNTER
S/w [facility] pharmacy and they did receive new rx for tramadol 50 mg from Dr. Coy.    Tracy RUSSELL RN  EP Triage

## 2019-11-12 NOTE — TELEPHONE ENCOUNTER
Can take 1-2 tabs every 6hrs as needed, rx resent.  Did not change the quantity.  This is not a month's supply, should last her at least 6 months.     Heidy Coy MD

## 2019-11-12 NOTE — TELEPHONE ENCOUNTER
[facility] pharmacist calling requesting clarification on tramadol 50 mg directions.  Currently states to take 2 tabs q 6 hours as needed for pain maximum 2 tabs per day.   The quantity is written for 30 pills.  Not sure which sig to go with 2 tabs every 6 hours or max 2 tabs per day and the quantity needs to be 60 tabs for a month supply.    Please clarify.  Pharmacy pended.    Tracy RUSSELL RN  EP Triage

## 2019-11-13 ENCOUNTER — TELEPHONE (OUTPATIENT)
Dept: FAMILY MEDICINE | Facility: CLINIC | Age: 79
End: 2019-11-13

## 2019-11-13 DIAGNOSIS — E89.0 HYPOTHYROIDISM, POSTABLATIVE: Primary | ICD-10-CM

## 2019-11-13 NOTE — TELEPHONE ENCOUNTER
Pt calling states she is leaving to go to [place] on Tuesday next week.  States she read Dr. Coy's message on my chart about her thyroid.  Pt would like a lab letter to take with her to [place] to have lab drawn there and results faxed to Dr. Coy.  Ok to mail lab letter home.    Advised rx was sent to [facility] in [place].      Pt can be reached at [phone].    Tracy RUSSELL RN  EP Triage

## 2019-11-13 NOTE — TELEPHONE ENCOUNTER
Reason for Call:  Other prescription    Detailed comments: Connie called to speak with Dr. Coy. Stated she received a [employer] message regarding her medication and she has a few questions. Please give her a call back when possible. Thank you!    Phone Number Patient can be reached at: Cell number on file:    Telephone Information:   Mobile [phone]       Best Time: today    Can we leave a detailed message on this number? YES    Call taken on 11/13/2019 at 10:10 AM by Ayesha Dunn

## 2019-11-14 ENCOUNTER — TELEPHONE (OUTPATIENT)
Dept: FAMILY MEDICINE | Facility: CLINIC | Age: 79
End: 2019-11-14

## 2019-11-14 DIAGNOSIS — L71.9 ROSACEA: Primary | ICD-10-CM

## 2019-11-14 NOTE — TELEPHONE ENCOUNTER
Reason for Call:  Other      Detailed comments: Pt  Would like to go back to the old  metrogel  - new one cost $600.00 at [facility]   [place]    Phone Number Patient can be reached at: Cell number on file:    Telephone Information:   Mobile [phone]       Best Time: any   Can we leave a detailed message on this number? Yes    Call taken on 11/14/2019 at 2:53 PM by Hailey Huff

## 2019-12-15 ENCOUNTER — HEALTH MAINTENANCE LETTER (OUTPATIENT)
Age: 79
End: 2019-12-15

## 2020-02-13 ENCOUNTER — TRANSFERRED RECORDS (OUTPATIENT)
Dept: HEALTH INFORMATION MANAGEMENT | Facility: CLINIC | Age: 80
End: 2020-02-13

## 2020-02-19 ENCOUNTER — TELEPHONE (OUTPATIENT)
Dept: FAMILY MEDICINE | Facility: CLINIC | Age: 80
End: 2020-02-19

## 2020-02-19 DIAGNOSIS — E89.0 HYPOTHYROIDISM, POSTABLATIVE: ICD-10-CM

## 2020-02-19 RX ORDER — THYROID 60 MG/1
TABLET ORAL
Qty: 160 TABLET | Refills: 3 | Status: SHIPPED | OUTPATIENT
Start: 2020-02-19 | End: 2020-02-19

## 2020-02-19 RX ORDER — THYROID 60 MG/1
TABLET ORAL
Qty: 170 TABLET | Refills: 0 | Status: SHIPPED | OUTPATIENT
Start: 2020-02-19

## 2020-02-19 NOTE — TELEPHONE ENCOUNTER
Reason for Call:  Medication or medication refill:    Do you use a [facility]?  Name of the pharmacy and phone number for the current request:     [id number] - [place] - [address]    Name of the medication requested: thyroid (ARMOUR THYROID) 60 MG tablet    Other request:     Can we leave a detailed message on this number? YES    Phone number patient can be reached at: Home number on file [phone] (home)    Best Time: anytime    Call taken on 2/19/2020 at 11:40 AM by Annelise Espinoza

## 2020-02-19 NOTE — TELEPHONE ENCOUNTER
"    Last Written Prescription Date:  11/12/19  Last Fill Quantity: 160,  # refills: 3   Last office visit: 11/11/2019 with prescribing provider:    Future Office Visit:    Requested Prescriptions   Pending Prescriptions Disp Refills     thyroid (SARA THYROID) 60 MG PO tablet 160 tablet 3     Sig: Take 2 tablets per day Mon-Fri, take 1 tablet Sa-Sun.       Thyroid Protocol Failed - 2/19/2020 11:41 AM        Failed - Normal TSH on file in past 12 months     Recent Labs   Lab Test 11/11/19  1350   TSH 0.06*              Passed - Patient is 12 years or older        Passed - Recent (12 mo) or future (30 days) visit within the authorizing provider's specialty     Patient has had an office visit with the authorizing provider or a provider within the authorizing providers department within the previous 12 mos or has a future within next 30 days. See \"Patient Info\" tab in inbasket, or \"Choose Columns\" in Meds & Orders section of the refill encounter.              Passed - Medication is active on med list        Passed - No active pregnancy on record     If patient is pregnant or has had a positive pregnancy test, please check TSH.          Passed - No positive pregnancy test in past 12 months     If patient is pregnant or has had a positive pregnancy test, please check TSH.            "

## 2020-02-19 NOTE — TELEPHONE ENCOUNTER
I got a lab results notice from [place], where Connie spends the winter.  Her TSH is 28 and free T4 is low at 0.47.      I would like her to go back to taking 2 tabs 6 days a week and 1 tab the 7th day of the week. It might be a good idea to have her see an endocrinologist when she gets back to [place].      Heidy Coy MD

## 2020-02-19 NOTE — TELEPHONE ENCOUNTER
Called patient and informed her of MD message below. Patient verbalized understanding and agrees with plan.     Hue Machuca RN, BSN  [facility]

## 2021-01-15 ENCOUNTER — HEALTH MAINTENANCE LETTER (OUTPATIENT)
Age: 81
End: 2021-01-15

## 2021-09-04 ENCOUNTER — HEALTH MAINTENANCE LETTER (OUTPATIENT)
Age: 81
End: 2021-09-04

## 2022-02-19 ENCOUNTER — HEALTH MAINTENANCE LETTER (OUTPATIENT)
Age: 82
End: 2022-02-19

## 2022-10-16 ENCOUNTER — HEALTH MAINTENANCE LETTER (OUTPATIENT)
Age: 82
End: 2022-10-16

## 2023-04-01 ENCOUNTER — HEALTH MAINTENANCE LETTER (OUTPATIENT)
Age: 83
End: 2023-04-01